# Patient Record
Sex: MALE | Race: ASIAN | Employment: FULL TIME | URBAN - METROPOLITAN AREA
[De-identification: names, ages, dates, MRNs, and addresses within clinical notes are randomized per-mention and may not be internally consistent; named-entity substitution may affect disease eponyms.]

---

## 2017-03-01 ENCOUNTER — MED REC SCAN ONLY (OUTPATIENT)
Dept: FAMILY MEDICINE CLINIC | Facility: CLINIC | Age: 52
End: 2017-03-01

## 2017-05-08 ENCOUNTER — TELEPHONE (OUTPATIENT)
Dept: FAMILY MEDICINE CLINIC | Facility: CLINIC | Age: 52
End: 2017-05-08

## 2017-05-08 NOTE — TELEPHONE ENCOUNTER
Left detailed message on patient cell voicemail to call [facility] EMERGENCY [facility] at [phone].

## 2019-06-11 ENCOUNTER — TELEPHONE (OUTPATIENT)
Dept: FAMILY MEDICINE CLINIC | Facility: CLINIC | Age: 54
End: 2019-06-11

## 2019-08-14 ENCOUNTER — LABORATORY ENCOUNTER (OUTPATIENT)
Dept: LAB | Age: 54
End: 2019-08-14
Attending: FAMILY MEDICINE
Payer: COMMERCIAL

## 2019-08-14 ENCOUNTER — OFFICE VISIT (OUTPATIENT)
Dept: FAMILY MEDICINE CLINIC | Facility: CLINIC | Age: 54
End: 2019-08-14
Payer: COMMERCIAL

## 2019-08-14 VITALS
TEMPERATURE: 98 F | BODY MASS INDEX: 26.48 KG/M2 | WEIGHT: 185 LBS | DIASTOLIC BLOOD PRESSURE: 82 MMHG | RESPIRATION RATE: 16 BRPM | HEART RATE: 70 BPM | HEIGHT: 70 IN | SYSTOLIC BLOOD PRESSURE: 120 MMHG

## 2019-08-14 DIAGNOSIS — Z12.11 COLON CANCER SCREENING: ICD-10-CM

## 2019-08-14 DIAGNOSIS — Z00.00 LABORATORY EXAM ORDERED AS PART OF ROUTINE GENERAL MEDICAL EXAMINATION: ICD-10-CM

## 2019-08-14 DIAGNOSIS — Z00.00 WELLNESS EXAMINATION: Primary | ICD-10-CM

## 2019-08-14 LAB
ALBUMIN SERPL-MCNC: 4.2 G/DL (ref 3.4–5)
ALBUMIN/GLOB SERPL: 1.2 {RATIO} (ref 1–2)
ALP LIVER SERPL-CCNC: 103 U/L (ref 45–117)
ALT SERPL-CCNC: 25 U/L (ref 16–61)
ANION GAP SERPL CALC-SCNC: 4 MMOL/L (ref 0–18)
AST SERPL-CCNC: 29 U/L (ref 15–37)
BASOPHILS # BLD AUTO: 0.05 X10(3) UL (ref 0–0.2)
BASOPHILS NFR BLD AUTO: 1 %
BILIRUB SERPL-MCNC: 0.4 MG/DL (ref 0.1–2)
BUN BLD-MCNC: 19 MG/DL (ref 7–18)
BUN/CREAT SERPL: 14.8 (ref 10–20)
CALCIUM BLD-MCNC: 8.9 MG/DL (ref 8.5–10.1)
CHLORIDE SERPL-SCNC: 106 MMOL/L (ref 98–112)
CHOLEST SMN-MCNC: 173 MG/DL (ref ?–200)
CO2 SERPL-SCNC: 28 MMOL/L (ref 21–32)
COMPLEXED PSA SERPL-MCNC: 0.7 NG/ML (ref ?–4)
CREAT BLD-MCNC: 1.28 MG/DL (ref 0.7–1.3)
DEPRECATED RDW RBC AUTO: 41.4 FL (ref 35.1–46.3)
EOSINOPHIL # BLD AUTO: 0.05 X10(3) UL (ref 0–0.7)
EOSINOPHIL NFR BLD AUTO: 1 %
ERYTHROCYTE [DISTWIDTH] IN BLOOD BY AUTOMATED COUNT: 12.5 % (ref 11–15)
GLOBULIN PLAS-MCNC: 3.6 G/DL (ref 2.8–4.4)
GLUCOSE BLD-MCNC: 101 MG/DL (ref 70–99)
HCT VFR BLD AUTO: 47.5 % (ref 39–53)
HDLC SERPL-MCNC: 43 MG/DL (ref 40–59)
HGB BLD-MCNC: 15.3 G/DL (ref 13–17.5)
IMM GRANULOCYTES # BLD AUTO: 0.02 X10(3) UL (ref 0–1)
IMM GRANULOCYTES NFR BLD: 0.4 %
LDLC SERPL CALC-MCNC: 91 MG/DL (ref ?–100)
LYMPHOCYTES # BLD AUTO: 1.21 X10(3) UL (ref 1–4)
LYMPHOCYTES NFR BLD AUTO: 23.8 %
M PROTEIN MFR SERPL ELPH: 7.8 G/DL (ref 6.4–8.2)
MCH RBC QN AUTO: 29.4 PG (ref 26–34)
MCHC RBC AUTO-ENTMCNC: 32.2 G/DL (ref 31–37)
MCV RBC AUTO: 91.2 FL (ref 80–100)
MONOCYTES # BLD AUTO: 0.62 X10(3) UL (ref 0.1–1)
MONOCYTES NFR BLD AUTO: 12.2 %
NEUTROPHILS # BLD AUTO: 3.13 X10 (3) UL (ref 1.5–7.7)
NEUTROPHILS # BLD AUTO: 3.13 X10(3) UL (ref 1.5–7.7)
NEUTROPHILS NFR BLD AUTO: 61.6 %
NONHDLC SERPL-MCNC: 130 MG/DL (ref ?–130)
OSMOLALITY SERPL CALC.SUM OF ELEC: 288 MOSM/KG (ref 275–295)
PLATELET # BLD AUTO: 136 10(3)UL (ref 150–450)
POTASSIUM SERPL-SCNC: 4.4 MMOL/L (ref 3.5–5.1)
RBC # BLD AUTO: 5.21 X10(6)UL (ref 4.3–5.7)
SODIUM SERPL-SCNC: 138 MMOL/L (ref 136–145)
TRIGL SERPL-MCNC: 193 MG/DL (ref 30–149)
TSI SER-ACNC: 3.31 MIU/ML (ref 0.36–3.74)
VLDLC SERPL CALC-MCNC: 39 MG/DL (ref 0–30)
WBC # BLD AUTO: 5.1 X10(3) UL (ref 4–11)

## 2019-08-14 PROCEDURE — 84443 ASSAY THYROID STIM HORMONE: CPT

## 2019-08-14 PROCEDURE — 80053 COMPREHEN METABOLIC PANEL: CPT

## 2019-08-14 PROCEDURE — 36415 COLL VENOUS BLD VENIPUNCTURE: CPT

## 2019-08-14 PROCEDURE — 85025 COMPLETE CBC W/AUTO DIFF WBC: CPT

## 2019-08-14 PROCEDURE — 80061 LIPID PANEL: CPT

## 2019-08-14 PROCEDURE — 99386 PREV VISIT NEW AGE 40-64: CPT | Performed by: FAMILY MEDICINE

## 2019-08-14 NOTE — PATIENT INSTRUCTIONS
Thank you for choosing Keyur Soni MD at [place] [address]  To Do: Enio Damico  1. Please see age appropriate health prevention below    [employer] is located in [address]. Monday, Tuesday & Friday – 8 a.m. to 4 p.m.   Wednesday, Tab Salas the benefits outweigh those potential risks and we strive to make you healthier and to improve your quality of life.     Referrals, and Radiology Information:    If your insurance requires a referral to a specialist, please allow 5 business days to process exams   Blood pressure All men in this age group Yearly checkup if your blood pressure is normal  Normal blood pressure is less than 120/80 mm Hg  If your blood pressure reading is higher than normal, follow the advice of your healthcare provider      Shayna this age group Ask your healthcare provider   Vaccine Who needs it How often   Chickenpox (varicella) All men in this age group who have no record of this infection or vaccine 2 doses; second dose should be given at least 4 weeks after the first dose   Hep provider At routine exams   Use of daily aspirin Men in this age group at risk for cardiovascular health problems At routine exams   Use of tobacco and the health effects it can cause All men in this age group Every visit   [facility]

## 2019-08-14 NOTE — H&P
Wellness Exam    REASON FOR VISIT:    Elena Foster is a 48year old male who presents for an [address]     Current Complaints: Mr. Marcia Tiwari is a pleasant 49 y/o M here for his wellness exam  Flu Shot: see immunization record  Health SCHEDULE Recommendation Internal Lab or Procedure External Lab or Procedure   Colonoscopy Screen Every 10 years Colonoscopy due on 11/22/2015    Flex Sigmoidoscopy Screen  Every 5 years No results found for this or any previous visit.     Fecal Occult Blood Age of Onset   • Other (Cerebral Artery Thrombosis [Other]) Other    • Diabetes Mother         DM   • Hypertension Other         Family history of   • Stroke Father       SOCIAL HISTORY:   Social History    Tobacco Use      Smoking status: Never Smoker cervical adenopathy. Neurological: He is alert and oriented to person, place, and time. He has normal reflexes. Skin: Skin is warm. No rash noted. No erythema. with normal hair  Psychiatric: He has a normal mood and affect.  His behavior is normal. Pneumococcal, Zoster, Tetanus     Immunization History   Administered Date(s) Administered   • >=3 YRS FLUZONE OR FLUARIX QUAD PRESERVE FREE SINGLE DOSE (56841) FLU CLINIC 12/09/2016   • >=3 YRS TRI  MULTIDOSE VIAL (20065) FLU CLINIC 10/24/2014   • HEP B 0

## 2020-06-15 ENCOUNTER — TELEPHONE (OUTPATIENT)
Dept: FAMILY MEDICINE CLINIC | Facility: CLINIC | Age: 55
End: 2020-06-15

## 2020-06-15 NOTE — TELEPHONE ENCOUNTER
Spoke with pt, advised results are still pending. He did the test at the [facility] in [place] - advised that results are taking about 4-5 days to get. States he was also tested at [address].  He will call if he hears from [address] first.

## 2020-06-16 ENCOUNTER — HOSPITAL ENCOUNTER (EMERGENCY)
Age: 55
Discharge: HOME | End: 2020-06-16
Attending: EMERGENCY MEDICINE
Payer: COMMERCIAL

## 2020-06-16 ENCOUNTER — APPOINTMENT (OUTPATIENT)
Dept: GENERAL RADIOLOGY | Age: 55
End: 2020-06-16
Attending: EMERGENCY MEDICINE
Payer: COMMERCIAL

## 2020-06-16 VITALS
TEMPERATURE: 100 F | SYSTOLIC BLOOD PRESSURE: 140 MMHG | DIASTOLIC BLOOD PRESSURE: 83 MMHG | RESPIRATION RATE: 18 BRPM | OXYGEN SATURATION: 94 % | WEIGHT: 193 LBS | BODY MASS INDEX: 27.02 KG/M2 | HEART RATE: 80 BPM | HEIGHT: 71 IN

## 2020-06-16 DIAGNOSIS — U07.1 PNEUMONIA DUE TO 2019 NOVEL CORONAVIRUS: ICD-10-CM

## 2020-06-16 DIAGNOSIS — J12.82 PNEUMONIA DUE TO 2019 NOVEL CORONAVIRUS: ICD-10-CM

## 2020-06-16 DIAGNOSIS — R06.02 SHORTNESS OF BREATH: Primary | ICD-10-CM

## 2020-06-16 PROCEDURE — 99284 EMERGENCY DEPT VISIT MOD MDM: CPT

## 2020-06-16 PROCEDURE — 99453 REM MNTR PHYSIOL PARAM SETUP: CPT

## 2020-06-16 PROCEDURE — 85025 COMPLETE CBC W/AUTO DIFF WBC: CPT | Performed by: EMERGENCY MEDICINE

## 2020-06-16 PROCEDURE — 80053 COMPREHEN METABOLIC PANEL: CPT | Performed by: EMERGENCY MEDICINE

## 2020-06-16 PROCEDURE — 96360 HYDRATION IV INFUSION INIT: CPT

## 2020-06-16 PROCEDURE — 71045 X-RAY EXAM CHEST 1 VIEW: CPT | Performed by: EMERGENCY MEDICINE

## 2020-06-16 RX ORDER — SODIUM CHLORIDE 9 MG/ML
INJECTION, SOLUTION INTRAVENOUS ONCE
Status: COMPLETED | OUTPATIENT
Start: 2020-06-16 | End: 2020-06-16

## 2020-06-16 RX ORDER — ACETAMINOPHEN 500 MG
1000 TABLET ORAL ONCE
Status: COMPLETED | OUTPATIENT
Start: 2020-06-16 | End: 2020-06-16

## 2020-06-16 RX ORDER — DEXAMETHASONE 4 MG/1
4 TABLET ORAL
Qty: 7 TABLET | Refills: 0 | Status: ON HOLD | OUTPATIENT
Start: 2020-06-16 | End: 2020-06-24

## 2020-06-16 RX ORDER — AZITHROMYCIN 250 MG/1
TABLET, FILM COATED ORAL
Qty: 1 PACKAGE | Refills: 0 | Status: ON HOLD | OUTPATIENT
Start: 2020-06-16 | End: 2020-06-24

## 2020-06-16 NOTE — ED INITIAL ASSESSMENT (HPI)
Was tested +covid on Sunday. Cough and SOB. Chills and headache. Headache, no fever. No vomiting/diarrhea. Fatigue.

## 2020-06-16 NOTE — ED PROVIDER NOTES
Patient Seen in: THE [facility] Emergency Department In [place]      History   Patient presents with:  Fatigue    Stated Complaint: tested +covid sob, no appetite    HPI    51-year-old male who reports a recent positive COVID-19 test presents with shortness o Exam    General:  Vitals as listed. No acute distress   HEENT: Sclerae anicteric. Conjunctivae show no pallor.   Oropharynx clear, mucous membranes moist   Neck: supple, no rigidity   Lungs: good air exchange and clear   Heart: regular rate rhythm and no seen at both lung bases, right greater than left. CP angles are sharp. Heart and pulmonary vessels are normal caliber. Mediastinal contours are smooth. There is tortuosity and ectasia of  the thoracic aorta.   CONCLUSION:  Mild bibasilar opacities consi Tab  500 mg once followed by 250 mg daily x 4 days  Qty: 1 Package Refills: 0    dexamethasone (DECADRON) 4 MG tablet  Take 1 tablet (4 mg total) by mouth daily with breakfast for 7 days.   Qty: 7 tablet Refills: 0

## 2020-06-18 ENCOUNTER — PATIENT OUTREACH (OUTPATIENT)
Dept: CASE MANAGEMENT | Age: 55
End: 2020-06-18

## 2020-06-18 NOTE — PROGRESS NOTES
Home Monitoring Condition Update    Covid19+ test date: 6/14/20  Contact date: 6/18/20    Consent Verification:  Assessment Completed With: Patient  HIPAA Verified?   Yes    COVID-19 HOME MONITORING 6/18/2020   Temperature 98.4   Reading From Ear   SPO2 9 Patient instructed to follow manufacturers instructions and not to exceed 3 grams per day. Patient verbalizes understanding. Patient went to ER 6/16 for SOB. SOB has improved a little bit.  Patient will go to ER if SOB worsens or if pulse oximeter show O2

## 2020-06-19 ENCOUNTER — PATIENT OUTREACH (OUTPATIENT)
Dept: CASE MANAGEMENT | Age: 55
End: 2020-06-19

## 2020-06-19 NOTE — PROGRESS NOTES
Home Monitoring Condition Update    Covid19+ test date: 6/14/20  Contact date: 6/19/20      Consent Verification:  Assessment Completed With: Patient  HIPAA Verified?   Yes    COVID-19 HOME MONITORING 6/19/2020   Temperature 98.5   Reading From Ear   SPO2 Patient will take now. Patient advised to follow BRAT diet, push fluids. Patient still having SOB with activity. Has been monitoring O2 with pulse oximeter. Patient will go to ER for readings below 90%. Patient not SOB during call.  Able to speak in full se

## 2020-06-20 ENCOUNTER — PATIENT OUTREACH (OUTPATIENT)
Dept: CASE MANAGEMENT | Age: 55
End: 2020-06-20

## 2020-06-20 ENCOUNTER — HOSPITAL ENCOUNTER (INPATIENT)
Facility: HOSPITAL | Age: 55
LOS: 5 days | Discharge: HOME | DRG: 177 | End: 2020-06-25
Attending: EMERGENCY MEDICINE | Admitting: INTERNAL MEDICINE
Payer: COMMERCIAL

## 2020-06-20 ENCOUNTER — APPOINTMENT (OUTPATIENT)
Dept: GENERAL RADIOLOGY | Facility: HOSPITAL | Age: 55
DRG: 177 | End: 2020-06-20
Attending: EMERGENCY MEDICINE
Payer: COMMERCIAL

## 2020-06-20 DIAGNOSIS — U07.1 PNEUMONIA DUE TO COVID-19 VIRUS: Primary | ICD-10-CM

## 2020-06-20 DIAGNOSIS — J12.82 PNEUMONIA DUE TO COVID-19 VIRUS: Primary | ICD-10-CM

## 2020-06-20 PROCEDURE — 99223 1ST HOSP IP/OBS HIGH 75: CPT | Performed by: INTERNAL MEDICINE

## 2020-06-20 PROCEDURE — 71045 X-RAY EXAM CHEST 1 VIEW: CPT | Performed by: EMERGENCY MEDICINE

## 2020-06-20 RX ORDER — ONDANSETRON 2 MG/ML
4 INJECTION INTRAMUSCULAR; INTRAVENOUS EVERY 6 HOURS PRN
Status: DISCONTINUED | OUTPATIENT
Start: 2020-06-20 | End: 2020-06-25

## 2020-06-20 RX ORDER — DEXTROSE AND SODIUM CHLORIDE 5; .45 G/100ML; G/100ML
INJECTION, SOLUTION INTRAVENOUS CONTINUOUS
Status: DISCONTINUED | OUTPATIENT
Start: 2020-06-20 | End: 2020-06-21

## 2020-06-20 RX ORDER — ACETAMINOPHEN 325 MG/1
650 TABLET ORAL EVERY 6 HOURS PRN
Status: DISCONTINUED | OUTPATIENT
Start: 2020-06-20 | End: 2020-06-25

## 2020-06-20 RX ORDER — ENOXAPARIN SODIUM 100 MG/ML
40 INJECTION SUBCUTANEOUS DAILY
Status: DISCONTINUED | OUTPATIENT
Start: 2020-06-20 | End: 2020-06-25

## 2020-06-20 NOTE — ED INITIAL ASSESSMENT (HPI)
Pt here after being dx covid+ on Tuesday. Pt notes he is SOB and feeling weak. Pt 88 on RA pt on 2l 02.

## 2020-06-20 NOTE — PROGRESS NOTES
Home Monitoring Condition Update    Covid19+ test date: 6/14/20  Contact date: 6/20/20      Consent Verification:  Assessment Completed With: Patient  HIPAA Verified?   Yes    COVID-19 HOME MONITORING 6/20/2020   Temperature 98.1   Reading From Mouth   SP is drinking plenty of water. Recommend patient try Pedialyte since he is having diarrhea. -Patient verbalized understanding. Patient advised to take tylenol as needed for symptoms.  Patient instructed to follow manufacturers instructions and not to exceed

## 2020-06-20 NOTE — ED PROVIDER NOTES
Patient Seen in: [facility] Emergency Department      History   Patient presents with:  Dyspnea YONAS SOB    Stated Complaint: COVID +, feeling more SOB    HPI    This is a 78-year-old male that was COVID positive 6/14/20 who presents to the emergency Wt 88.5 kg   SpO2 92%   BMI 27.20 kg/m²         Physical Exam    GENERAL: Awake, alert oriented x3, ill  appearing. SKIN: Normal, warm, and dry. HEENT:  Pupils equally round and reactive to light. Conjuctiva clear.   Oropharynx is clear and moist.   Lung -----------         ------                     CBC W/ DIFFERENTIAL[[id number]]          Abnormal            Final result                 Please view results for these tests on the individual orders.    PROCALCITONIN   TROPONIN I   URINALY 6/20/2020  7:44 PM           Disposition and Plan     Clinical Impression:  Pneumonia due to COVID-19 virus  (primary encounter diagnosis)    Disposition:  Admit  6/20/2020  7:44 pm    Follow-up:  No follow-up provider specified.         Medications Prescri

## 2020-06-21 PROBLEM — E87.1 HYPONATREMIA: Status: ACTIVE | Noted: 2020-06-21

## 2020-06-21 PROBLEM — R09.02 HYPOXIA: Status: ACTIVE | Noted: 2020-06-21

## 2020-06-21 PROCEDURE — 3E033WL INTRODUCTION OF IMMUNOSUPPRESSIVE INTO PERIPHERAL VEIN, PERCUTANEOUS: ICD-10-PCS | Performed by: STUDENT IN AN ORGANIZED HEALTH CARE EDUCATION/TRAINING PROGRAM

## 2020-06-21 PROCEDURE — 99233 SBSQ HOSP IP/OBS HIGH 50: CPT | Performed by: HOSPITALIST

## 2020-06-21 PROCEDURE — 3E033GC INTRODUCTION OF OTHER THERAPEUTIC SUBSTANCE INTO PERIPHERAL VEIN, PERCUTANEOUS APPROACH: ICD-10-PCS | Performed by: STUDENT IN AN ORGANIZED HEALTH CARE EDUCATION/TRAINING PROGRAM

## 2020-06-21 RX ORDER — ALBUTEROL SULFATE 90 UG/1
4 AEROSOL, METERED RESPIRATORY (INHALATION)
Status: DISCONTINUED | OUTPATIENT
Start: 2020-06-21 | End: 2020-06-25

## 2020-06-21 RX ORDER — FAMOTIDINE 20 MG/1
20 TABLET ORAL 2 TIMES DAILY
Status: DISCONTINUED | OUTPATIENT
Start: 2020-06-21 | End: 2020-06-25

## 2020-06-21 NOTE — PROGRESS NOTES
Brief Note:    Patient seen and examined  Admits to dyspnea  2L at rest  /77 (BP Location: Left arm)   Pulse 75   Temp 99.1 °F (37.3 °C) (Oral)   Resp 18   Ht 5' 11\" (1.803 m)   Wt 195 lb (88.5 kg)   SpO2 100%   BMI 27.20 kg/m²     CVS S1 S2  Lungs

## 2020-06-21 NOTE — PLAN OF CARE
Received pt as tx from PMU. Arrived on 15L HF NC. Sats 100%. Pt c/o mild sob and tachypneic w/ RR in high 20s but overall appears comfortable. Loading dose Remdesivir infusing. Pt started on scheduled albuterol inh. Consult for LETICIA ZURITA notified.   Able to

## 2020-06-21 NOTE — PLAN OF CARE
A/O x4. Vital signs stable. Tolerating 1L with saturations of >94%, will wean as able. Self prones. IS encouraged. Tele-NSR. Voiding freely. No complaints of pain. Up self. IV abx. Safety precautions in place. Pt updated on plan of care. WCTM.     Problem: report SOB or any respiratory difficulty  - Respiratory Therapy support as indicated  - Manage/alleviate anxiety  - Monitor for signs/symptoms of CO2 retention  Outcome: Progressing

## 2020-06-21 NOTE — H&P
[facility]                                                               History & Physical         Tanmay Lynch Patient Status:  Emergency    1965 MRN [id number]   Location [address] [facility] Attending [facility] admission)      Review of Systems:  A comprehensive 14 point review of systems was completed. Pertinent positives and negatives noted in the the HPI.     Physical Exam:     Vital signs: Blood pressure 134/87, pulse 94, temperature 99 °F (37.2 °C), temperat on 6/20/2020 at 7:20 PM         ASSESSMENT / PLAN:     1. COVID pneumonia with hypoxia  1. Oxygen  2. Incentive spirometry  3. Frequent proning position  4. ID consult  5. Follow COVID inflammatory markers  6. Subcutaneous Lovenox for DVT prophylaxis  7.  I

## 2020-06-21 NOTE — PROGRESS NOTES
NURSING ADMISSION NOTE      Patient admitted via Cart  Oriented to room. 523  Safety precautions initiated. Bed in low position. Call light in reach. Admission navigator completed. All pt questions answered. ID consulted.

## 2020-06-21 NOTE — PROGRESS NOTES
Pt is alert and oriented x4, pt was on 1 liter NC, Dr Darrick Bolton was in room and pts sats started dropping , he put pt on 10 L, still sats 87-88%. PT was put on 15 L and transferred to ICU. Wife was called and notified.  Report given and pt was moved to room 45

## 2020-06-21 NOTE — CONSULTS
[facility]  Report of Consultation    Chitra Crystalshorn Patient Status:  Inpatient    1965 MRN [id number]   [facility] 4SW-A Attending Warner Perkins MD   Hosp Day # 1 PCP Estelle Nix MD     Reason for Consultation:  Hypoxi used smokeless tobacco. He reports that he does not drink alcohol or use drugs.   Works as a  with no occupational exposure    Allergies:  No Known Allergies    Medications:  azithromycin (ZITHROMAX Z-PRINCE) 250 MG Oral Tab, 500 mg once followed alert, cooperative, oriented. No respiratory distress. Head: Normocephalic, without obvious abnormality, atraumatic. Eyes/Nose: Clear   Throat: Lips, mucosa, and tongue normal.  No thrush noted.   On limited exam   Neck: trachea midline, no adenopathy, as outpatient now receiving remdesivir --no evidence to support bacterial infection  · Following d-dimer currently not elevated  · Mild transaminitis slight trending down  · Hyponatremia with plans to follow    Plan:  · Incentive spirometry/proning positio

## 2020-06-21 NOTE — CONSULTS
INFECTIOUS DISEASE CONSULTATION    Theresa Allred Patient Status:  Inpatient    1965 MRN [id number]   [facility] 5NW-A Attending Andreia Moncada MD   Hosp Day # 1 PCP Nupur Sears Daily  •  acetaminophen (TYLENOL) tab 650 mg, 650 mg, Oral, Q6H PRN  •  ondansetron HCl (ZOFRAN) injection 4 mg, 4 mg, Intravenous, Q6H PRN  No current facility-administered medications on file prior to encounter.    azithromycin (ZITHROMAX Z-PRINCE) 250 MG Or PCT 0.09       Cardiac  Recent Labs   Lab 06/20/20 1912 06/21/20  0002 06/21/20  0417   TROP <0.045 <0.045 <0.045   PBNP 332*  --   --        Creatinine Kinase  Recent Labs   Lab 06/20/20 1912          Inflammatory Markers  Recent Labs   Lab 06/2

## 2020-06-21 NOTE — PROGRESS NOTES
[facility]  Progress Note     Guzmanjosé miguel Monsalvekelli Patient Status:  Inpatient    1965 MRN [id number]   [facility] 5NW-A Attending Herberth Darby MD   Hosp Day # 1 PCP Soniya Apple MD     Chief Complaint: COVID    S: Patient se 06/21/20  0002 06/21/20  0417   TROP <0.045 <0.045 <0.045     --   --             Imaging: Imaging data reviewed in Epic.     Medications:   • [START ON 6/22/2020] remdesivir IVPB  100 mg Intravenous Q24H   • Albuterol Sulfate HFA  4 puff Inhalation Q

## 2020-06-21 NOTE — ED NOTES
Report given and endorsed to rn desire, pt aox3, nad, skin warm and intact, wf bed to be cleaned, when bed ready,  Pt will be sent, will continue to monitor

## 2020-06-22 ENCOUNTER — APPOINTMENT (OUTPATIENT)
Dept: GENERAL RADIOLOGY | Facility: HOSPITAL | Age: 55
DRG: 177 | End: 2020-06-22
Attending: INTERNAL MEDICINE
Payer: COMMERCIAL

## 2020-06-22 PROCEDURE — 99232 SBSQ HOSP IP/OBS MODERATE 35: CPT | Performed by: HOSPITALIST

## 2020-06-22 PROCEDURE — 71045 X-RAY EXAM CHEST 1 VIEW: CPT | Performed by: INTERNAL MEDICINE

## 2020-06-22 NOTE — PROGRESS NOTES
[facility]                INFECTIOUS DISEASE PROGRESS NOTE    Rakesh Malin Patient Status:  Inpatient    1965 MRN [id number]   [facility] 4SW-A Attending Viola Velazquez MD   Hosp Day # 2 PCP Jackie Ibanez MD     Antib 101   CO2 24.0 26.0 29.0   ALKPHO 122* 111 107   AST 73* 67* 64*   ALT 58 51 58   BILT 0.6 0.5 0.8   TP 8.6* 7.8 7.7       No results found for: [facility] Encounter on 06/20/20   1.  BLOOD CULTURE     Status: None (Preliminary result)

## 2020-06-22 NOTE — PROGRESS NOTES
[facility]  Progress Note    Verna Jorge Patient Status:  Inpatient    1965 MRN [id number]   [facility] Army Casi MD   Hosp Day # 2 PCP Sarah Lea MD     Subjective:  Verna Jorge is a(n) 47 ye 4.8 4.7    101 101   CO2 24.0 26.0 29.0   ALKPHO 122* 111 107   AST 73* 67* 64*   ALT 58 51 58   BILT 0.6 0.5 0.8   TP 8.6* 7.8 7.7       Recent Labs   Lab 06/21/20  0417 06/22/20  0442   MG 2.5 2.7*       No results found for: PHOS, PHOSPHORUS     R

## 2020-06-22 NOTE — CM/SW NOTE
Patient was screened during rounds, COVID positive and no needs are identified at this time. Will follow for possible oxygen needs. From home with family. RN to contact SW/CM if needs arise.

## 2020-06-22 NOTE — PAYOR COMM NOTE
--------------  Appropriate for inpatient status per guidelines for SOB, hypoxia and decreased appetite due to COVID pneumonia.      ADMISSION REVIEW     Payor: Jeimy [facility] #:  [id number]  Authorization Number: [id number] (Room air)       Current:/79 (BP Location: Left arm)   Pulse 95   Temp 98.9 °F (37.2 °C)   Resp 20   Ht 180.3 cm (5' 11\")   Wt 88.5 kg   SpO2 92%   BMI 27.20 kg/m²          Physical Exam    GENERAL: Awake, alert oriented x3, ill  appearing.    SKIN: DIFFERENTIAL WITH PLATELET.   Procedure                               Abnormality         Status                     ---------                               -----------         ------                     CBC W/ DIFFERENTIAL[[id number]]          Abnormal COVID-19 virus  (primary encounter diagnosis)    Disposition:  Admit  6/20/2020  7:44 pm                      H&P - H&P Note        [facility]                                                               History & Physical         Malinda Carson 96 %.     On 2 L of oxygen via nasal cannula    General: No acute distress. HEENT: Moist mucous membranes. EOM-I. PERRL  Neck: No lymphadenopathy. No JVD. No carotid bruits.   Respiratory: Decreased breath sounds at the bases with few basilar crackles  C though on the long end  Advised that Remdesivir is approved under EUA and is still investigational, offered other treatments. Follow daily CMP.  Given fact sheet for patients  Consider decadron if 02 needs increase, or Tociluzimab if deteriorates injury  7. Hyponatremia  1.  Monitor UOP, creatinine and electrolytes            6/22 PULM     Plan:  · Continue pulm toilet, including IS, self proning  · Complete course of Remdesivir, s/p Actemra  · Trend inflammatory markers and D-dimer  · O2 per protoc

## 2020-06-22 NOTE — PLAN OF CARE
Assumed care of patient at 0730. Patient A &O x4. Hemodynamics stable. Patient remained on 3-5L HFNC through out shift. Up to chair with meals. Self proning. Dry nonproductive cough. Tolerating oral intake.   Voids per urinal.  No c/o of pain or discomfo

## 2020-06-22 NOTE — PLAN OF CARE
Assumed care of patient at approx 1930 following RN shift report. Patient alert and oriented; pleasant throughout shift. Patient on 2L nasal cannula with oxygen saturation 94-98%. Patient has a dry, non-produtive cought.  Patient tolerating regular diet wit

## 2020-06-22 NOTE — PROGRESS NOTES
[facility]  Progress Note     Vane Valerio Patient Status:  Inpatient    1965 MRN [id number]   [facility] 5NW-A Attending Chiquis Antunez MD   Hosp Day # 2 PCP Christelle Carter MD     Chief Complaint: COVID    S: Patient up 06/21/20  0002 06/21/20  0417   TROP <0.045 <0.045 <0.045     --   --             Imaging: Imaging data reviewed in Epic.     Medications:   • remdesivir IVPB  100 mg Intravenous Q24H   • Albuterol Sulfate HFA  4 puff Inhalation Q4H WA   • famoTIDine

## 2020-06-23 ENCOUNTER — PATIENT OUTREACH (OUTPATIENT)
Dept: CASE MANAGEMENT | Age: 55
End: 2020-06-23

## 2020-06-23 PROCEDURE — 99232 SBSQ HOSP IP/OBS MODERATE 35: CPT | Performed by: HOSPITALIST

## 2020-06-23 NOTE — PROGRESS NOTES
[facility]                INFECTIOUS DISEASE PROGRESS NOTE    Keke Torre Patient Status:  Inpatient    1965 MRN [id number]   [facility] 4SW-A Attending Rosa Tripp MD   Hosp Day # 3 PCP Johnson Solorzano MD     Antib 107 100   AST 67* 64* 52*   ALT 51 58 60   BILT 0.5 0.8 0.7   TP 7.8 7.7 7.6       No results found for: [facility] Encounter on 06/20/20   1.  BLOOD CULTURE     Status: None (Preliminary result)    Collection Time: 06/20/20  7:33 PM

## 2020-06-23 NOTE — PROGRESS NOTES
[facility]  Progress Note     [facility] Patient [facility]:  [facility]    1965 MRN [id number]   [facility] 5NW-A Attending Army Casi MD   Hosp Day # 3 PCP Sarah Lea MD     Chief Complaint: COVID    S: Patient st <0.045 <0.045     --   --             Imaging: Imaging data reviewed in Epic.     Medications:   • remdesivir IVPB  100 mg Intravenous Q24H   • Albuterol Sulfate HFA  4 puff Inhalation Q4H WA   • famoTIDine  20 mg Oral BID   • enoxaparin  40 mg Subcut

## 2020-06-23 NOTE — PROGRESS NOTES
[facility]  Progress Note    Maxine Poon Patient Status:  Inpatient    1965 MRN [id number]   [facility] 4SW-A Attending Alexia Curling, MD   Hosp Day # 3 PCP Irene Kaye MD     Subjective:  Maxine Poon is a(n) 47 ye * 100* 103*   BUN 17 16 20*   CREATSERUM 1.20 1.08 1.18   GFRAA 79 89 80   GFRNAA 68 77 70   CA 8.7 8.6 8.5   ALB 2.8* 2.8* 2.7*   * 134* 136   K 4.8 4.7 4.8    101 104   CO2 26.0 29.0 28.0   ALKPHO 111 107 100   AST 67* 64* 52*   ALT pneumonia-currently on weaning FiO2   · COVID-19 pneumonia with markedly elevated ferritin/inflammatory markers-has been on Decadron as outpatient, now receiving remdesivir --no evidence to support bacterial infection  · Following d-dimer, currently not el

## 2020-06-23 NOTE — PLAN OF CARE
Assumed care of pt around 1930. On 3L NC. Afebrile, NSR on tele. Tolerating diet. Voiding adequately in urinal. Denies pain. Using IS independently with desaturation noted after use; recovers slowly. Self-proning.     Around 2148 pt noted to be tachycardic

## 2020-06-24 PROCEDURE — 99232 SBSQ HOSP IP/OBS MODERATE 35: CPT | Performed by: STUDENT IN AN ORGANIZED HEALTH CARE EDUCATION/TRAINING PROGRAM

## 2020-06-24 RX ORDER — BENZONATATE 100 MG/1
100 CAPSULE ORAL 3 TIMES DAILY PRN
Qty: 30 CAPSULE | Refills: 1 | Status: SHIPPED | OUTPATIENT
Start: 2020-06-24 | End: 2020-10-07 | Stop reason: ALTCHOICE

## 2020-06-24 NOTE — PLAN OF CARE
Pt a/o x4. O2 titrated off. Tolerating RA. Tachypnea and dyspnea w/activity, resolves at rest. Dry cough noted. VSS. Good appetite. Adequate urine output.  Up to chair and bathroom w/standby assist. Home o2 screening performed: 92% on RA at rest, 90% on RA

## 2020-06-24 NOTE — PROGRESS NOTES
[facility]  Progress Note     Vane Valerio Patient Status:  Inpatient    1965 MRN [id number]   [facility] 5NW-A Attending Chiquis Antunez MD   Hosp Day # 4 PCP Christelle Carter MD     Chief Complaint: COVID    S: Patient do <0.045     --   --             Imaging: Imaging data reviewed in Epic.     Medications:   • remdesivir IVPB  100 mg Intravenous Q24H   • Albuterol Sulfate HFA  4 puff Inhalation Q4H WA   • famoTIDine  20 mg Oral BID   • enoxaparin  40 mg Subcutaneous

## 2020-06-24 NOTE — PROGRESS NOTES
[facility]                INFECTIOUS DISEASE PROGRESS NOTE    Helen Lynch Patient Status:  Inpatient    1965 MRN [id number]   [facility] 4SW-A Attending Irasema Castrejon MD   Hosp Day # 4 PCP Natalia Ochoa MD     Antib ALKPHO 107 100 99   AST 64* 52* 179*   ALT 58 60 125*   BILT 0.8 0.7 0.8   TP 7.7 7.6 7.5       No results found for: [facility] Encounter on 06/20/20   1.  BLOOD CULTURE     Status: None (Preliminary result)    Collection Time: 06/20

## 2020-06-24 NOTE — DISCHARGE SUMMARY
[facility]  DISCHARGE SUMMARY     Mando Berry Patient Status:  Inpatient    1965 MRN [id number]   [facility] 4SW-A Attending Trista Elizabeth MD   [facility] Day # 4 PCP Mini Leigh MD     Date of Admission: 2020  Date o ID    Discharge Medication List:     Discharge Medications      STOP taking these medications    azithromycin 250 MG Tabs  Commonly known as:  Zithromax Z-Jasvir        dexamethasone 4 MG tablet  Commonly known as:  Decadron               [place]

## 2020-06-24 NOTE — PROGRESS NOTES
[facility]  Progress Note    Richardstephanie Azam Patient Status:  Inpatient    1965 MRN [id number]   [facility] 4SW-A Attending Warner Perkins MD   Hosp Day # 4 PCP Estelle Nix MD     Subjective:  Chitra Crystalshorn is a(n) 47 ye BUN 16 20* 23*   CREATSERUM 1.08 1.18 1.25   GFRAA 89 80 75   GFRNAA 77 70 65   CA 8.6 8.5 8.4*   ALB 2.8* 2.7* 3.0*   * 136 136   K 4.7 4.8 4.8    104 105   CO2 29.0 28.0 27.0   ALKPHO 107 100 99   AST 64* 52* 179*   ALT 58 60 125*   BILT 0. FiO2   · COVID-19 pneumonia with markedly elevated ferritin/inflammatory markers-has been on Decadron as outpatient, now receiving remdesivir --no evidence to support bacterial infection  · Following d-dimer, currently not elevated  · Mild transaminitis, s

## 2020-06-24 NOTE — PLAN OF CARE
VSS. On 2L NC sat at 95%. Desats upon exertion. Denies any pain or discomfort. Afebrile. Occasional dry cough.  Will cont to monitor

## 2020-06-25 VITALS
OXYGEN SATURATION: 91 % | HEART RATE: 93 BPM | BODY MASS INDEX: 24.17 KG/M2 | WEIGHT: 172.63 LBS | TEMPERATURE: 98 F | DIASTOLIC BLOOD PRESSURE: 80 MMHG | SYSTOLIC BLOOD PRESSURE: 122 MMHG | RESPIRATION RATE: 29 BRPM | HEIGHT: 71 IN

## 2020-06-25 PROCEDURE — 99239 HOSP IP/OBS DSCHRG MGMT >30: CPT | Performed by: STUDENT IN AN ORGANIZED HEALTH CARE EDUCATION/TRAINING PROGRAM

## 2020-06-25 NOTE — PROGRESS NOTES
[facility]  Progress Note    Kyra Hanna Patient Status:  Inpatient    1965 MRN [id number]   [facility] 4SW-A Attending Judie Mendoza MD   Hosp Day # 5 PCP Lucian Chery MD     Subjective:  Kyra Hanna is a(n) 47 yea negative    Radiology:      Chest x-ray continues to improve is reviewed    Medications reviewed     Assessment and Plan:   Patient Active Problem List:     Varicose vein of leg     Gastroenteritis     Food allergy     History of Helicobacter pylori infect

## 2020-06-25 NOTE — PLAN OF CARE
Discussed discharge papers with patient. No further questions at this time. Assumed care from night shift RN. Patient alert and oriented x 4.  RA. SR on monitor. Up to chair. Voids via urinal.  No complaints of pain. Possible discharge home today.

## 2020-06-25 NOTE — PLAN OF CARE
VSS, at [facility] sat at 96%. Afebrile. AxOx4.  Possible discharge home todayv

## 2020-06-25 NOTE — PROGRESS NOTES
[facility]                INFECTIOUS DISEASE PROGRESS NOTE    Gisela Lui Patient Status:  Inpatient    1965 MRN [id number]   [facility] 4SW-A Attending Winsome Mcdonald MD   Hosp Day # 5 PCP Jillian Ch MD     Antib 8.5 8.4* 8.5   ALB 2.7* 3.0* 3.0*    136 137   K 4.8 4.8 4.5    105 105   CO2 28.0 27.0 29.0   ALKPHO 100 99 93   AST 52* 179* 142*   ALT 60 125* 141*   BILT 0.7 0.8 0.7   TP 7.6 7.5 7.3       No results found for: [facility]  Microbiology    Hospit

## 2020-06-26 ENCOUNTER — PATIENT OUTREACH (OUTPATIENT)
Dept: CASE MANAGEMENT | Age: 55
End: 2020-06-26

## 2020-06-26 ENCOUNTER — TELEPHONE (OUTPATIENT)
Dept: FAMILY MEDICINE CLINIC | Facility: CLINIC | Age: 55
End: 2020-06-26

## 2020-06-26 DIAGNOSIS — Z02.9 ENCOUNTERS FOR UNSPECIFIED ADMINISTRATIVE PURPOSE: ICD-10-CM

## 2020-06-26 PROCEDURE — 1111F DSCHRG MED/CURRENT MED MERGE: CPT

## 2020-06-26 NOTE — PROGRESS NOTES
Initial Post Discharge Follow Up   Discharge Date: 6/25/20  Contact Date: 6/26/2020    Consent Verification:  Assessment Completed With: Patient  HIPAA Verified?   Yes    Discharge Dx:     Acute hypoxic resp failure due to COVID 19 PNA  RETA  HypoNa     W prescribed? No  Are you having any concerns with constipation? No    Referrals/orders at D/C:  Home Health/Services ordered at D/C? No   DME ordered at D/C? No      Needs post D/C:   Now that you are home, are there any needs or concerns you need addressed

## 2020-06-26 NOTE — TELEPHONE ENCOUNTER
Spoke to pt for TCM today. Pt does not have HFU appt scheduled at this time. TCM/HFU appt recommended by 7/9/2020 as pt is a moderate risk for readmission. Please advise.     BOOK BY DATE (last date for TCM): 7/9/2020    TRIAGE:  Please f/u with pt and t

## 2020-06-26 NOTE — PAYOR COMM NOTE
--------------  DISCHARGE REVIEW    Payor: [address] #:  [id number]  Authorization Number: [id number]    Admit date: 6/20/20  Admit time:  2153  Discharge Date: 6/25/2020  1:12 PM       Discharge Summary signed by Mona Bajwa

## 2020-06-29 NOTE — TELEPHONE ENCOUNTER
Future Appointments   Date Time Provider Ricardo Aisha   7/2/2020 12:00 PM Que Henderson MD [facility] [address] [facility] [address]     Patient verbally consents to a virtual/telephone check-in service for the date and time noted above.  Patient understands and accepts

## 2020-07-02 ENCOUNTER — VIRTUAL PHONE E/M (OUTPATIENT)
Dept: FAMILY MEDICINE CLINIC | Facility: CLINIC | Age: 55
End: 2020-07-02
Payer: COMMERCIAL

## 2020-07-02 ENCOUNTER — TELEPHONE (OUTPATIENT)
Dept: FAMILY MEDICINE CLINIC | Facility: CLINIC | Age: 55
End: 2020-07-02

## 2020-07-02 DIAGNOSIS — J96.01 ACUTE HYPOXEMIC RESPIRATORY FAILURE (HCC): ICD-10-CM

## 2020-07-02 DIAGNOSIS — U07.1 PNEUMONIA DUE TO COVID-19 VIRUS: Primary | ICD-10-CM

## 2020-07-02 DIAGNOSIS — J12.82 PNEUMONIA DUE TO COVID-19 VIRUS: Primary | ICD-10-CM

## 2020-07-02 PROCEDURE — 99214 OFFICE O/P EST MOD 30 MIN: CPT | Performed by: FAMILY MEDICINE

## 2020-07-02 NOTE — PATIENT INSTRUCTIONS
Thank you for choosing Yony Kent MD at [place] [address]  To Do: Enio Damico  1. Coronavirus Disease 2019 (COVID-19)     [place] [address] is committed to the safety and well-being of our patients, members, employees, and communities.  As outside of the home, wear a facemask. 9. Avoid sharing personal items with other people in your household, like dishes, towels, and bedding   10. Clean all surfaces that are touched often, like counters, tabletops, and doorknobs.  Use household cleaning s for COVID-19, you should also stay home and away from others for a total of 10 days after your symptoms started, OR until 72 hours after your fever is gone and symptoms are getting better, whichever is longer.     Additional Information      You can also ge Physical Therapy call [phone] usually in [facility] in Clinic in [place] at [facility]. [address] Mon-Fri at 8am-7:30 p.m., and Sat/Sun 9:00a. m.-4:30 p.m.   Also at [address]  Call [phone] for info    • Please call our office ab year.

## 2020-07-02 NOTE — PROGRESS NOTES
Virtual Telephone Check-In    Kelly Olvera verbally consents to a Virtual/Telephone Check-In visit on 07/02/20. Patient has been referred to the [facility] website at [email or web address] to review the yearly Consent to Treat document.     Patient unders

## 2020-07-02 NOTE — PROGRESS NOTES
HPI:    Patient ID: Kera Godwin is a 47year old male. HPI  Mr. Eugenie Martel is a pleasant 28-year-old gentleman was been generally healthy who tested positive for COVID previously on 6/14/2020.   He was admitted at [facility] of on 6/20/2024 w comfortable over the phone is able to speak in full sentences with no difficulty.               ASSESSMENT/PLAN:   Pneumonia due to covid-19 virus  (primary encounter diagnosis)  -Clinically improved; await repeat chest x-ray and recommendations from pulmon

## 2020-07-02 NOTE — TELEPHONE ENCOUNTER
Patient verbally consents to a virtual/telephone check-in service for 7/10/2020 at 11:00 am noted above. Patient understands and accepts financial responsibility for any deductible, co-insurance, and co-pays associated with this service.   Future Appointmen

## 2020-07-07 ENCOUNTER — HOSPITAL ENCOUNTER (OUTPATIENT)
Dept: GENERAL RADIOLOGY | Age: 55
Discharge: HOME | End: 2020-07-07
Attending: INTERNAL MEDICINE
Payer: COMMERCIAL

## 2020-07-07 DIAGNOSIS — J12.82 PNEUMONIA DUE TO COVID-19 VIRUS: ICD-10-CM

## 2020-07-07 DIAGNOSIS — U07.1 PNEUMONIA DUE TO COVID-19 VIRUS: ICD-10-CM

## 2020-07-07 PROCEDURE — 71046 X-RAY EXAM CHEST 2 VIEWS: CPT | Performed by: INTERNAL MEDICINE

## 2020-07-10 ENCOUNTER — VIRTUAL PHONE E/M (OUTPATIENT)
Dept: FAMILY MEDICINE CLINIC | Facility: CLINIC | Age: 55
End: 2020-07-10
Payer: COMMERCIAL

## 2020-07-10 DIAGNOSIS — J12.82 PNEUMONIA DUE TO COVID-19 VIRUS: Primary | ICD-10-CM

## 2020-07-10 DIAGNOSIS — U07.1 PNEUMONIA DUE TO COVID-19 VIRUS: Primary | ICD-10-CM

## 2020-07-10 PROCEDURE — 99441 PHONE E/M BY PHYS 5-10 MIN: CPT | Performed by: FAMILY MEDICINE

## 2020-07-10 NOTE — PATIENT INSTRUCTIONS
Thank you for choosing Nupur Sears MD at [place] [address]  To Do: Enio Hernandezaberasto  1.  I discussed with patient the risks and benefits of proceeding or delaying treatment/procedure/surgery including the risks of COVID-19 exposure and infection richa [address] [place] [address] [phone] for info    • Please call our office about any questions regarding your treatment/medicines/tests as a result of today's visit.  For your safety, read the entire package insert of all medicines prescribed to you and be

## 2020-07-10 NOTE — PROGRESS NOTES
Virtual Telephone Check-In    Laly Still verbally consents to a Virtual/Telephone Check-In visit on 07/10/20. Patient has been referred to the [facility] website at [email or web address] to review the yearly Consent to Treat document.     Patient unders

## 2020-07-10 NOTE — PROGRESS NOTES
Virtual Telephone Check-In    Vane Valerio verbally consents to a Virtual/Telephone Check-In visit on 07/10/20. Patient has been referred to the [facility] website at [email or web address] to review the yearly Consent to Treat document.     Patient unders

## 2020-09-30 ENCOUNTER — TELEPHONE (OUTPATIENT)
Dept: FAMILY MEDICINE CLINIC | Facility: CLINIC | Age: 55
End: 2020-09-30

## 2020-09-30 NOTE — TELEPHONE ENCOUNTER
Please tell him that the recommendations at this point is for him not to have the pneumonia vaccine.

## 2020-09-30 NOTE — TELEPHONE ENCOUNTER
Pt is not due for pneumonia vaccine until he is 72. Please schedule pt for nurse visit for flu shot. Thanks!

## 2020-09-30 NOTE — TELEPHONE ENCOUNTER
Patient calling, patient requesting order for pneumovax. Will like to get done with flu shot, will schedule once order has been placed.

## 2020-09-30 NOTE — TELEPHONE ENCOUNTER
Pt is requesting a pneumonia vaccine but is pt due for one? His care gap says completed \"aged out\". Please advise.

## 2020-10-02 NOTE — TELEPHONE ENCOUNTER
Called patient, informed pneumonia vaccine not recommended until 72 yrs of age. Patient decided to schedule annual exam with PCP, will get flu shot on day of appointment.

## 2020-10-07 ENCOUNTER — OFFICE VISIT (OUTPATIENT)
Dept: FAMILY MEDICINE CLINIC | Facility: CLINIC | Age: 55
End: 2020-10-07
Payer: COMMERCIAL

## 2020-10-07 VITALS
WEIGHT: 186 LBS | DIASTOLIC BLOOD PRESSURE: 74 MMHG | HEART RATE: 74 BPM | RESPIRATION RATE: 16 BRPM | BODY MASS INDEX: 26.04 KG/M2 | HEIGHT: 71 IN | SYSTOLIC BLOOD PRESSURE: 118 MMHG | OXYGEN SATURATION: 99 % | TEMPERATURE: 98 F

## 2020-10-07 DIAGNOSIS — Z12.11 SCREENING FOR COLON CANCER: ICD-10-CM

## 2020-10-07 DIAGNOSIS — Z12.5 SCREENING FOR MALIGNANT NEOPLASM OF PROSTATE: ICD-10-CM

## 2020-10-07 DIAGNOSIS — Z00.00 WELLNESS EXAMINATION: Primary | ICD-10-CM

## 2020-10-07 PROCEDURE — 3008F BODY MASS INDEX DOCD: CPT | Performed by: FAMILY MEDICINE

## 2020-10-07 PROCEDURE — 3078F DIAST BP <80 MM HG: CPT | Performed by: FAMILY MEDICINE

## 2020-10-07 PROCEDURE — 3074F SYST BP LT 130 MM HG: CPT | Performed by: FAMILY MEDICINE

## 2020-10-07 PROCEDURE — 99396 PREV VISIT EST AGE 40-64: CPT | Performed by: FAMILY MEDICINE

## 2020-10-07 RX ORDER — ERGOCALCIFEROL (VITAMIN D2) 10 MCG
TABLET ORAL
COMMUNITY

## 2020-10-07 NOTE — PROGRESS NOTES
Wellness Exam    REASON FOR VISIT:    Raad Crews is a 47year old male who presents for an [address]     Current Complaints: Mr. Rey Frederick is here for his wellness exam  Flu Shot: see immunization record  Health Maintenance Topics w A1C  Glucose (mg/dL)   Date Value   06/25/2020 99     GLUCOSE (mg/dL)   Date Value   05/23/2013 93         Gonorrhea Screening If high risk No results found for: GONOCOCCUS    HIV Screening For all adults age 22-65, older adults at increased risk No result Negative for urgency, frequency and difficulty urinating. Musculoskeletal: Negative for arthralgias and no gait problem. Skin: Negative for color change and rash. Neurological: Negative for tremors, weakness and numbness.    Hematological: Negative fo ordered for him and will await results  Overall I do not have any concerns with his health and asked him to follow-up in 1 year or as needed. No orders of the defined types were placed in this encounter.       Imaging & Consults:  None    His 5 year prev Systems         Current Outpatient Medications   Medication Sig Dispense Refill   • Multiple Vitamin (DAILY VALUE MULTIVITAMIN) Oral Tab Take by mouth.        Allergies:No Known Allergies   PHYSICAL EXAM:   Physical Exam           ASSESSMENT/PLAN:   Wilian Welsh

## 2020-10-07 NOTE — PATIENT INSTRUCTIONS
Thank you for choosing Clyde Dill MD at [place] [address]  To Do: Enio Damico  1. Please see age appropriate health prevention below    [facility] is located in [address]. Monday, Tuesday & Friday – 8 a.m. to 4 p.m.   Wednesday, [place] the benefits outweigh those potential risks and we strive to make you healthier and to improve your quality of life.     Referrals, and Radiology Information:    If your insurance requires a referral to a specialist, please allow 5 business days to process group  Yearly checkup if your blood pressure is normal   Normal blood pressure is less than 120/80 mm Hg   If your blood pressure reading is higher than normal, follow the advice of your healthcare provider    Colorectal cancer All men at average risk in t persons who have quit within past 15 years, and have a 30-pack-year smoking history (Eligibility criteria may vary across major organizations; Age limit may extend to age [de-identified].)   Talk with your healthcare provider for more information.   Yearly lung cancer s dose.    Haemophilus influenzae Type B (HIB)  Men at increased risk for infection – talk with your healthcare provider  1 to 3 doses   Influenza (flu) All men in this age group  Once a year   Measles, mumps, rubella (MMR)  Men in this age group through the provider  At routine exams   Daily low-dose aspirin  Men ages 36 to 79 years who are not at increased risk of bleeding and are at high risk of cardiovascular disease 4; talk with your healthcare provider  At routine exams   Use of statins Men ages 21 to 76

## 2021-03-28 NOTE — PLAN OF CARE
Patient alert, oriented, following commands. Denies pain. Up to chair with standby assist. Afebrile. 3L NC. Will continue to monitor. call placed to team following evaluation; D/w ANA MARIA Rubin/Physician/Nursing/Patient

## 2022-06-08 ENCOUNTER — OFFICE VISIT (OUTPATIENT)
Dept: FAMILY MEDICINE CLINIC | Facility: CLINIC | Age: 57
End: 2022-06-08
Payer: COMMERCIAL

## 2022-06-08 VITALS
SYSTOLIC BLOOD PRESSURE: 126 MMHG | TEMPERATURE: 98 F | WEIGHT: 191.19 LBS | DIASTOLIC BLOOD PRESSURE: 74 MMHG | BODY MASS INDEX: 26.77 KG/M2 | HEIGHT: 71 IN | RESPIRATION RATE: 14 BRPM | HEART RATE: 82 BPM

## 2022-06-08 DIAGNOSIS — Z00.00 WELLNESS EXAMINATION: Primary | ICD-10-CM

## 2022-06-08 DIAGNOSIS — R06.83 SNORING: ICD-10-CM

## 2022-06-08 DIAGNOSIS — I83.893 VARICOSE VEINS OF BILATERAL LOWER EXTREMITIES WITH OTHER COMPLICATIONS: ICD-10-CM

## 2022-06-08 DIAGNOSIS — Z12.5 SCREENING FOR MALIGNANT NEOPLASM OF PROSTATE: ICD-10-CM

## 2022-06-08 PROCEDURE — 3008F BODY MASS INDEX DOCD: CPT | Performed by: FAMILY MEDICINE

## 2022-06-08 PROCEDURE — 3078F DIAST BP <80 MM HG: CPT | Performed by: FAMILY MEDICINE

## 2022-06-08 PROCEDURE — 3074F SYST BP LT 130 MM HG: CPT | Performed by: FAMILY MEDICINE

## 2022-06-08 PROCEDURE — 99213 OFFICE O/P EST LOW 20 MIN: CPT | Performed by: FAMILY MEDICINE

## 2022-06-08 PROCEDURE — 99396 PREV VISIT EST AGE 40-64: CPT | Performed by: FAMILY MEDICINE

## 2022-07-09 LAB
ABSOLUTE BASOPHILS: 31 CELLS/UL (ref 0–200)
ABSOLUTE EOSINOPHILS: 31 CELLS/UL (ref 15–500)
ABSOLUTE LYMPHOCYTES: 1329 CELLS/UL (ref 850–3900)
ABSOLUTE MONOCYTES: 308 CELLS/UL (ref 200–950)
ABSOLUTE NEUTROPHILS: 2702 CELLS/UL (ref 1500–7800)
ALBUMIN/GLOBULIN RATIO: 1.5 (CALC) (ref 1–2.5)
ALBUMIN: 4.5 G/DL (ref 3.6–5.1)
ALKALINE PHOSPHATASE: 90 U/L (ref 35–144)
ALT: 15 U/L (ref 9–46)
AST: 26 U/L (ref 10–35)
BASOPHILS: 0.7 %
BILIRUBIN, TOTAL: 0.6 MG/DL (ref 0.2–1.2)
BUN: 18 MG/DL (ref 7–25)
CALCIUM: 9.5 MG/DL (ref 8.6–10.3)
CARBON DIOXIDE: 30 MMOL/L (ref 20–32)
CHLORIDE: 104 MMOL/L (ref 98–110)
CHOL/HDLC RATIO: 4.2 (CALC)
CHOLESTEROL, TOTAL: 212 MG/DL
CREATININE: 1.15 MG/DL (ref 0.7–1.33)
EGFR IF AFRICN AM: 82 ML/MIN/1.73M2
EGFR IF NONAFRICN AM: 71 ML/MIN/1.73M2
EOSINOPHILS: 0.7 %
GLOBULIN: 3 G/DL (CALC) (ref 1.9–3.7)
GLUCOSE: 104 MG/DL (ref 65–99)
HDL CHOLESTEROL: 50 MG/DL
HEMATOCRIT: 46 % (ref 38.5–50)
HEMOGLOBIN: 15.2 G/DL (ref 13.2–17.1)
LDL-CHOLESTEROL: 124 MG/DL (CALC)
LYMPHOCYTES: 30.2 %
MCH: 29.6 PG (ref 27–33)
MCHC: 33 G/DL (ref 32–36)
MCV: 89.5 FL (ref 80–100)
MONOCYTES: 7 %
MPV: 12.9 FL (ref 7.5–12.5)
NEUTROPHILS: 61.4 %
NON-HDL CHOLESTEROL: 162 MG/DL (CALC)
PLATELET COUNT: 142 THOUSAND/UL (ref 140–400)
POTASSIUM: 4.7 MMOL/L (ref 3.5–5.3)
PROTEIN, TOTAL: 7.5 G/DL (ref 6.1–8.1)
PSA, TOTAL: 0.54 NG/ML
RDW: 13 % (ref 11–15)
RED BLOOD CELL COUNT: 5.14 MILLION/UL (ref 4.2–5.8)
SODIUM: 138 MMOL/L (ref 135–146)
T4, FREE: 1.3 NG/DL (ref 0.8–1.8)
TRIGLYCERIDES: 233 MG/DL
TSH: 2.31 MIU/L (ref 0.4–4.5)
WHITE BLOOD CELL COUNT: 4.4 THOUSAND/UL (ref 3.8–10.8)

## 2022-07-20 ENCOUNTER — TELEPHONE (OUTPATIENT)
Dept: FAMILY MEDICINE CLINIC | Facility: CLINIC | Age: 57
End: 2022-07-20

## 2022-07-20 LAB — AMB EXT COLOGUARD RESULT: NEGATIVE

## 2022-07-20 NOTE — TELEPHONE ENCOUNTER
Patient notified of negative cologaurd and negative lab results. Patient wants to  a copy of lab results. Labs at the .

## 2022-07-27 ENCOUNTER — OFFICE VISIT (OUTPATIENT)
Dept: SLEEP CENTER | Age: 57
End: 2022-07-27
Attending: FAMILY MEDICINE
Payer: COMMERCIAL

## 2022-07-27 DIAGNOSIS — R06.83 SNORING: ICD-10-CM

## 2022-07-27 PROCEDURE — 95810 POLYSOM 6/> YRS 4/> PARAM: CPT

## (undated) NOTE — LETTER
07/10/20    Malinda Carson      To Whom It May Concern: This letter has been written at the patient's request. The above patient was seen at [facility] for treatment of a medical condition from [place] [address] Pneumonia.     He was seen initially at the

## (undated) NOTE — Clinical Note
Patient enrolled in [facility] [place] [facility] program. Thank you.